# Patient Record
Sex: FEMALE | Race: ASIAN | NOT HISPANIC OR LATINO | Employment: STUDENT | URBAN - METROPOLITAN AREA
[De-identification: names, ages, dates, MRNs, and addresses within clinical notes are randomized per-mention and may not be internally consistent; named-entity substitution may affect disease eponyms.]

---

## 2017-01-26 ENCOUNTER — GENERIC CONVERSION - ENCOUNTER (OUTPATIENT)
Dept: OTHER | Facility: OTHER | Age: 32
End: 2017-01-26

## 2017-01-29 LAB — 25(OH)D3 SERPL-MCNC: 32.6 NG/ML (ref 30–100)

## 2017-02-02 ENCOUNTER — GENERIC CONVERSION - ENCOUNTER (OUTPATIENT)
Dept: OTHER | Facility: OTHER | Age: 32
End: 2017-02-02

## 2017-05-05 ENCOUNTER — GENERIC CONVERSION - ENCOUNTER (OUTPATIENT)
Dept: OTHER | Facility: OTHER | Age: 32
End: 2017-05-05

## 2017-05-08 ENCOUNTER — GENERIC CONVERSION - ENCOUNTER (OUTPATIENT)
Dept: OTHER | Facility: OTHER | Age: 32
End: 2017-05-08

## 2017-05-09 ENCOUNTER — GENERIC CONVERSION - ENCOUNTER (OUTPATIENT)
Dept: OTHER | Facility: OTHER | Age: 32
End: 2017-05-09

## 2017-05-09 LAB
HEPATITIS B SURFACE ANTIBODY (HISTORICAL): REACTIVE
HEPATITIS B SURFACE ANTIGEN (HISTORICAL): NEGATIVE

## 2017-05-10 ENCOUNTER — GENERIC CONVERSION - ENCOUNTER (OUTPATIENT)
Dept: OTHER | Facility: OTHER | Age: 32
End: 2017-05-10

## 2017-05-10 LAB
INTERPRETATION (HISTORICAL): NORMAL
QAUNTIFERON NIL VALUE (HISTORICAL): 0.04 IU/ML
QFT TB AG MINUS NIL VALUE (HISTORICAL): 0 IU/ML
QUANTIFERON CRITERIA (HISTORICAL): NORMAL
QUANTIFERON INCUB COMMENT (HISTORICAL): NORMAL
QUANTIFERON MITOGEN VALUE (HISTORICAL): 2.08 IU/ML
QUANTIFERON TB AG VALUE (HISTORICAL): 0.04 IU/ML
QUANTIFERON TB GOLD (HISTORICAL): NEGATIVE

## 2017-05-16 ENCOUNTER — GENERIC CONVERSION - ENCOUNTER (OUTPATIENT)
Dept: OTHER | Facility: OTHER | Age: 32
End: 2017-05-16

## 2017-05-17 LAB
HEP C RNA QUAL (HISTORICAL): <0.1 S/CO RATIO (ref 0–0.9)
INTERPRETATION (HISTORICAL): NORMAL

## 2017-05-18 ENCOUNTER — GENERIC CONVERSION - ENCOUNTER (OUTPATIENT)
Dept: OTHER | Facility: OTHER | Age: 32
End: 2017-05-18

## 2017-08-07 ENCOUNTER — GENERIC CONVERSION - ENCOUNTER (OUTPATIENT)
Dept: OTHER | Facility: OTHER | Age: 32
End: 2017-08-07

## 2017-12-05 ENCOUNTER — ALLSCRIPTS OFFICE VISIT (OUTPATIENT)
Dept: OTHER | Facility: OTHER | Age: 32
End: 2017-12-05

## 2017-12-05 DIAGNOSIS — R11.2 NAUSEA WITH VOMITING: ICD-10-CM

## 2017-12-05 DIAGNOSIS — E53.8 DEFICIENCY OF OTHER SPECIFIED B GROUP VITAMINS (CODE): ICD-10-CM

## 2017-12-05 DIAGNOSIS — G35 MULTIPLE SCLEROSIS (HCC): ICD-10-CM

## 2017-12-05 DIAGNOSIS — E55.9 VITAMIN D DEFICIENCY: ICD-10-CM

## 2017-12-06 NOTE — PROGRESS NOTES
Assessment    1  Nausea with vomiting (787 01) (R11 2)   2  Migraine headache (346 90) (G43 909)   3  Multiple sclerosis (340) (G35)    Plan  Migraine headache, Multiple sclerosis, Vitamin B 12 deficiency, Vitamin D deficiency    · (1) SED RATE; Status:Active; Requested for:05Dec2017;    · (1) VITAMIN B12; Status:Active; Requested for:05Dec2017;    · (1) VITAMIN D 25-HYDROXY; Status:Active; Requested for:05Dec2017;   Multiple sclerosis, Vitamin B 12 deficiency, Vitamin D deficiency    · (1) CBC/PLT/DIFF; Status:Active; Requested for:05Dec2017;    · (1) COMPREHENSIVE METABOLIC PANEL; Status:Active; Requested for:05Dec2017;    · (1) TSH WITH FT4 REFLEX; Status:Active; Requested for:05Dec2017;   Nausea with vomiting    · * US ABDOMEN COMPLETE; Status:Hold For - Scheduling; Requested for:05Dec2017;     Discussion/Summary    Suspected migraines with nausea and vomiting  May have an element of rebound headaches  Stop paracetamol with caffeine  No skipping meals  Stay hydrated  Defer starting prophylactic therapy for migraines for now pending results of labs and abdominal ultrasound  ophthalmology referral    Possible side effects of new medications were reviewed with the patient/guardian today  The treatment plan was reviewed with the patient/guardian  The patient/guardian understands and agrees with the treatment plan      Chief Complaint    1  Abdominal Pain   2  Nausea    History of Present Illness  One-month history of increasing frequency of headaches associated with nausea and vomiting  No visual aura  No noise or light sensitivity  known history of migraines  No nighttime pain  Patient was concerned her headaches were being triggered by spicy foods and eating leftovers  She has cut back on spices  She limits herself to 1 cup a tea a day  She has been using an over-the-counter product with caffeine paracetamol with caffeine   history of MS diagnosed > 1 year ago  she is currently on Tecfidera   1 episode of optic neuritis  intermittent numbness and tingling lower extremities  no weakness  no bowel or bladder problems  no dysphagia  no balance problems  MRI brain 2016 reviewed  documented vitamin D deficiency on vitamin D supplement  labs 2016 reviewed  Review of Systems   Constitutional: no fever,-- no recent weight gain,-- no chills-- and-- no recent weight loss  Eyes: no eyesight problems  ENT: no earache,-- no sore throat,-- no nasal discharge-- and-- no hoarseness  Cardiovascular: no chest pain-- and-- no palpitations  Respiratory: no cough,-- no orthopnea,-- no wheezing,-- no shortness of breath during exertion-- and-- no PND  Gastrointestinal: nausea-- and-- no reflux symptoms  On  PAN D which contains generic Protonix and Domperidone  , but-- no abdominal pain,-- no constipation,-- no diarrhea-- and-- no blood in stools  Genitourinary: last GYN > 1 year ago  periods normal , but-- no dysuria-- and-- no incontinence  Musculoskeletal: no arthralgias-- and-- no myalgias  Integumentary: no rashes-- and-- no skin lesions  Neurological: as noted in HPI,-- no dizziness-- and-- no difficulty walking  Psychiatric: no anxiety-- and-- no depression  Endocrine: history of vitamin D deficiency on vitamin D supplement, but-- no muscle weakness  Hematologic/Lymphatic: vitamin B 12 deficiency on OTC supplement  Active Problems  1  Migraine headache (346 90) (G43 909)   2  Multiple sclerosis (340) (G35)   3  Need for hepatitis C screening test (V73 89) (Z11 59)   4  Vitamin B 12 deficiency (266 2) (E53 8)   5  Vitamin D deficiency (268 9) (E55 9)    Family History  Mother    1  Family history of hypertension (V17 49) (Z82 49)   2  Family history of type 2 diabetes mellitus (V18 0) (Z83 3)  Paternal Grandmother    3  Family history of malignant neoplasm of breast (V16 3) (Z80 3)    Social History   · Never a smoker   · Social alcohol use (Z78 9)    Current Meds   1   Tecfidera 240 MG Oral Capsule Delayed Release; 1 tas 2 x daily Recorded   2  Vitamin D 2000 UNIT Oral Capsule; TAKE 1 CAPSULE Daily Recorded    Allergies  1  No Known Drug Allergies    Vitals  Vital Signs    Recorded: [id number] 11:23AM   Temperature 95 9 F   Heart Rate 84   Respiration 16   Systolic 876   Diastolic 58   Height 4 ft 11 in   Weight 121 lb 3 2 oz   BMI Calculated 24 48   BSA Calculated 1 49       Physical Exam   Constitutional  General appearance: No acute distress, well appearing and well nourished  Head and Face  Head and face: Normal  -- no scalp tenderness  Palpation of the face and sinuses: No sinus tenderness  Eyes  Conjunctiva and lids: No swelling, erythema or discharge  -- no nystagmus  Pupils and irises: Equal, round, reactive to light  -- no Robert Pernell pupil  Ophthalmoscopic examination: Normal fundi and optic discs  Ears, Nose, Mouth, and Throat  Otoscopic examination: Tympanic membranes translucent with normal light reflex  Canals patent without erythema  Lips, teeth, and gums: Normal, good dentition  -- tongue midline  Oropharynx: Normal with no erythema, edema, exudate or lesions  Neck  Neck: Supple, symmetric, trachea midline, no masses  Thyroid: Normal, no thyromegaly  There were no thyroid nodules  Pulmonary  Auscultation of lungs: Clear to auscultation  Cardiovascular  Auscultation of heart: Normal rate and rhythm, normal S1 and S2, no murmurs  Heart sounds: no gallop heard  Carotid pulses: 2+ bilaterally  right 2+,-- no bruit heard over the right carotid,-- left 2+-- and-- no bruit heard over the left carotid  Examination of extremities for edema and/or varicosities: Normal    Abdomen  Abdomen: Non-tender, no masses  Liver and spleen: No hepatomegaly or splenomegaly  Lymphatic  Palpation of lymph nodes in neck: No lymphadenopathy  no anterior cervical node enlargement,-- no posterior cervical node enlargement,-- no submandibular node enlargement-- and-- no supraclavicular node enlargement  Musculoskeletal  Gait and station: Normal    Muscle strength/tone: Normal   Motor Strength Findings: no pronator drift on the right,-- no pronator drift on the left,-- normal upper extremity strength-- and-- normal lower extremity strength  Skin  Skin and subcutaneous tissue: Normal without rashes or lesions  Palpation of skin and subcutaneous tissue: Normal turgor  Neurologic  Cranial nerves: Cranial nerves II-XII intact  -- Speech clear  -- hyper reflexia  No ankle clonus  Psychiatric  Mood and affect: Normal        Results/Data  (LC) HCV Antibody reflex to MIRANDA [id number] 12:35PM [facility]     Test Name Result Flag Reference   HCV Ab Hepatitis C virus Ab Signal/Cutoff <0 1 s/co ratio  0 0-0 9   Interpretation: Comment       Negative Not infected with HCV, unless recent infection is suspected or other evidence exists to indicate HCV infection  (1) HEP B SURFACE ANTIBODY [id number] 10:00AM [facility]     Test Name Result Flag Reference   Hep B Surface Ab, Qual Reactive       Non Reactive: Inconsistent with immunity,                                            less than 10 mIU/mL                              Reactive:     Consistent with immunity,                                            greater than 9 9 mIU/mL     () HBsAg Screen [id number] 10:00AM [facility]     Test Name Result Flag Reference   HBsAg Screen Negative  Negative     ([address] [facility]) QuantiFERON TB Gold (In Tube) [id number] 10:00AM [facility]     Test Name Result Flag Reference   QuantiFERON Incubation Comment     Specimen incubated at [address] [place], [place]  QuantiFERON TB Gold Negative  Negative   QuantiFERON Criteria Comment       To be considered positive a specimen should have a TB Ag minus Nil value greater than or equal to 0 35 IU/mL and in addition the TB Ag minus Nil value must be greater than or equal to 25% of the Nil value   There may be insufficient information in these values to differentiate between some negative and some indeterminate test values  QuantiFERON TB Ag Value 0 04 IU/mL     QuantiFERON Nil Value 0 04 IU/mL     QuantiFERON Mitogen Value 2 08 IU/mL     QFT TB Ag minus Nil Value 0 00 IU/mL     Interpretation: Comment       The QuantiFERON TB Gold (in Tube) assay is intended for use as an aid in the diagnosis of TB infection  Negative results suggest that there is no TB infection  In patients with high suspicion of exposure, a negative test should be repeated  A positive test indicates infection with Mycobacterium tuberculosis  Among individuals without tuberculosis infection, a positive test may be due to exposure to [place], M  helena or M  alpa  On the Internet, go to cdc gov/tb for further details  (1) VITAMIN D 25-HYDROXY [id number] 11:16AM Denise Gaspar courtesy copy of this report has been sent to the patient  Test Name Result Flag Reference   Vitamin D, 25-Hydroxy 32 6 ng/mL  30 0-100 0     Vitamin D deficiency has been defined by the [address] practice guideline as a level of serum 25-OH vitamin D less than 20 ng/mL (1,2)  The Endocrine Society went on to further define vitamin D insufficiency as a level between 21 and 29 ng/mL (2)  1  IOM ([place] of Medicine)  2010  Dietary reference    intakes for calcium and D  [facility]: The    [employer] [facility]  2  Rica MF, Minh PENG, Juju HOFFMAN, et al     Evaluation, treatment, and prevention of vitamin D    deficiency: an Endocrine Society clinical practice    guideline  JCEM  2011 Jul; 96(7):1911-30  (1) CBC/PLT/DIFF [id number] 10:01AM Denise Gaspar courtesy copy of this report has been sent to the patient  Test Name Result Flag Reference   WBC 6 6 x10E3/uL  3 4-10 8   RBC 4 41 x10E6/uL  3 77-5 28   Hemoglobin 12 1 g/dL  11 1-15 9   Hematocrit 37 0 %  34 0-46  6   MCV 84 fL  79-97   MCH 27 4 pg  26 6-33 0   MCHC 32 7 g/dL  31 5-35 7   RDW 13 6 %  12 3-15 4   Platelets 767 x10E3/uL  150-379   Neutrophils 67 %     Lymphs 27 %     Monocytes 5 %     Eos 1 %     Basos 0 %     Neutrophils (Absolute) 4 4 x10E3/uL  1 4-7 0   Lymphs (Absolute) 1 7 x10E3/uL  0 7-3 1   Monocytes(Absolute) 0 3 x10E3/uL  0 1-0 9   Eos (Absolute) 0 1 x10E3/uL  0 0-0 4   Baso (Absolute) 0 0 x10E3/uL  0 0-0 2   Immature Granulocytes 0 %     Immature Grans (Abs) 0 0 x10E3/uL  0 0-0 1     (1) COMPREHENSIVE METABOLIC PANEL [id number] 77:20IJ Katerin Cody     Test Name Result Flag Reference   Glucose, Serum 99 mg/dL  65-99   BUN 8 mg/dL  6-20   Creatinine, Serum 0 56 mg/dL L 0 57-1 00   BUN/Creatinine Ratio 14  8-20   Sodium, Serum 140 mmol/L  134-144   Potassium, Serum 4 6 mmol/L  3 5-5 2   Chloride, Serum 100 mmol/L     Carbon Dioxide, Total 21 mmol/L  18-29   Calcium, Serum 9 3 mg/dL  8 7-10 2   Protein, Total, Serum 7 0 g/dL  6 0-8 5   Albumin, Serum 4 6 g/dL  3 5-5 5   Globulin, Total 2 4 g/dL  1 5-4 5   A/G Ratio 1 9  1 1-2 5   Bilirubin, Total 0 4 mg/dL  0 0-1 2   Alkaline Phosphatase, S 51 IU/L     AST (SGOT) 11 IU/L  0-40   ALT (SGPT) 10 IU/L  0-32   eGFR If NonAfricn Am 126 mL/min/1 73  >59   eGFR If Africn Am 146 mL/min/1 73  >59     (1) LIPID PANEL, FASTING [id number] 10:01AM Katerin Cody     Test Name Result Flag Reference   Cholesterol, Total 171 mg/dL  100-199   Triglycerides 103 mg/dL  0-149   HDL Cholesterol 51 mg/dL  >39     According to ATP-III Guidelines, HDL-C >59 mg/dL is considered a negative risk factor for CHD  VLDL Cholesterol Jesse 21 mg/dL  5-40   LDL Cholesterol Calc 99 mg/dL  0-99   T  Chol/HDL Ratio 3 4 ratio units  0 0-4 4     T  Chol/HDL Ratio                                                            Men  Women                                              1/2 Avg  Risk  3 4    3 3                                                  Avg Risk  5 0    4 4                                               2X Avg  Risk  9 6    7 1                                               3X Avg  Risk 23 4   11 0     (1) VITAMIN D 25-HYDROXY [id number] 10:01AM Paul Cost     Test Name Result Flag Reference   Vitamin D, 25-Hydroxy 21 1 ng/mL L 30 0-100 0     Vitamin D deficiency has been defined by the [address] practice guideline as a level of serum 25-OH vitamin D less than 20 ng/mL (1,2)  The Endocrine Society went on to further define vitamin D insufficiency as a level between 21 and 29 ng/mL (2)  1  IOM ([place] of Medicine)  2010  Dietary reference    intakes for calcium and D  [address] [facility]: The    [employer]  2  Rica MF, Minh PENG, Juju HOFFMAN, et al     Evaluation, treatment, and prevention of vitamin D    deficiency: an Endocrine Society clinical practice    guideline  JCEM  2011 Jul; 96(7):1911-30  (1) VITAMIN B12 [id number] 10:01AM Paul Cost     Test Name Result Flag Reference   Vitamin B12 422 pg/mL  211-946     [facility]) Lyme Ab/Western Blot Reflex [id number] 10:01AM Paul Cost     Test Name Result Flag Reference   Lyme IgG/IgM Ab <0 91 ISR  0 00-0 90     Negative         <0 91                                                Equivocal  0 91 - 1 09                                                Positive         >1 09   Lyme Disease Ab, Quant, IgM <0 80 index  0 00-0 79     Negative         <0 80                                                Equivocal  0 80 - 1 19                                                Positive         >1 19                 IgM levels may peak at 3-6 weeks post infection, then                 gradually decline       (LC) TSH Rfx on Abnormal to Free T4 [id number] 10:01AM Paul Cost     Test Name Result Flag Reference   TSH 1 900 uIU/mL  0 450-4 500     [facility]) Sedimentation Rate-Westergren [id number] 10:01AM [facility]     Test Name Result Flag Reference   Sedimentation Rate-Westergren 10 mm/hr  0-32       Signatures   Electronically signed by : GISELE Dorado ; Dec  5 2017 12:44PM EST (Author)

## 2017-12-15 ENCOUNTER — HOSPITAL ENCOUNTER (OUTPATIENT)
Dept: ULTRASOUND IMAGING | Facility: HOSPITAL | Age: 32
Discharge: HOME | End: 2017-12-15
Attending: FAMILY MEDICINE
Payer: COMMERCIAL

## 2017-12-15 ENCOUNTER — GENERIC CONVERSION - ENCOUNTER (OUTPATIENT)
Dept: OTHER | Facility: OTHER | Age: 32
End: 2017-12-15

## 2017-12-15 DIAGNOSIS — R11.2 NAUSEA WITH VOMITING: ICD-10-CM

## 2017-12-15 PROCEDURE — 76700 US EXAM ABDOM COMPLETE: CPT

## 2017-12-16 LAB
25(OH)D3 SERPL-MCNC: 26 NG/ML (ref 30–100)
A/G RATIO (HISTORICAL): 1.8 (ref 1.2–2.2)
ALBUMIN SERPL BCP-MCNC: 4.7 G/DL (ref 3.5–5.5)
ALP SERPL-CCNC: 48 IU/L (ref 39–117)
ALT SERPL W P-5'-P-CCNC: 14 IU/L (ref 0–32)
AST SERPL W P-5'-P-CCNC: 15 IU/L (ref 0–40)
BASOPHILS # BLD AUTO: 0 %
BASOPHILS # BLD AUTO: 0 X10E3/UL (ref 0–0.2)
BILIRUB SERPL-MCNC: 0.3 MG/DL (ref 0–1.2)
BUN SERPL-MCNC: 12 MG/DL (ref 6–20)
BUN/CREA RATIO (HISTORICAL): 23 (ref 9–23)
CALCIUM SERPL-MCNC: 9.5 MG/DL (ref 8.7–10.2)
CHLORIDE SERPL-SCNC: 100 MMOL/L (ref 96–106)
CO2 SERPL-SCNC: 24 MMOL/L (ref 18–29)
CREAT SERPL-MCNC: 0.53 MG/DL (ref 0.57–1)
DEPRECATED RDW RBC AUTO: 13.7 % (ref 12.3–15.4)
EGFR AFRICAN AMERICAN (HISTORICAL): 146 ML/MIN/1.73
EGFR-AMERICAN CALC (HISTORICAL): 127 ML/MIN/1.73
EOSINOPHIL # BLD AUTO: 0 X10E3/UL (ref 0–0.4)
EOSINOPHIL # BLD AUTO: 1 %
ERYTHROCYTE SEDIMENTATION RATE (HISTORICAL): 4 MM/HR (ref 0–32)
GLUCOSE SERPL-MCNC: 94 MG/DL (ref 65–99)
HCT VFR BLD AUTO: 37.8 % (ref 34–46.6)
HGB BLD-MCNC: 12.6 G/DL (ref 11.1–15.9)
IMM.GRANULOCYTES (CD4/8) (HISTORICAL): 0 %
IMM.GRANULOCYTES (CD4/8) (HISTORICAL): 0 X10E3/UL (ref 0–0.1)
LYMPHOCYTES # BLD AUTO: 1.7 X10E3/UL (ref 0.7–3.1)
LYMPHOCYTES # BLD AUTO: 32 %
MCH RBC QN AUTO: 28.4 PG (ref 26.6–33)
MCHC RBC AUTO-ENTMCNC: 33.3 G/DL (ref 31.5–35.7)
MCV RBC AUTO: 85 FL (ref 79–97)
MONOCYTES # BLD AUTO: 0.4 X10E3/UL (ref 0.1–0.9)
MONOCYTES (HISTORICAL): 8 %
NEUTROPHILS # BLD AUTO: 3.1 X10E3/UL (ref 1.4–7)
NEUTROPHILS # BLD AUTO: 59 %
PLATELET # BLD AUTO: 276 X10E3/UL (ref 150–379)
POTASSIUM SERPL-SCNC: 4.5 MMOL/L (ref 3.5–5.2)
RBC (HISTORICAL): 4.44 X10E6/UL (ref 3.77–5.28)
SODIUM SERPL-SCNC: 140 MMOL/L (ref 134–144)
TOT. GLOBULIN, SERUM (HISTORICAL): 2.6 G/DL (ref 1.5–4.5)
TOTAL PROTEIN (HISTORICAL): 7.3 G/DL (ref 6–8.5)
TSH SERPL DL<=0.05 MIU/L-ACNC: 1.66 UIU/ML (ref 0.45–4.5)
VIT B12 SERPL-MCNC: 244 PG/ML (ref 232–1245)
WBC # BLD AUTO: 5.3 X10E3/UL (ref 3.4–10.8)

## 2017-12-17 ENCOUNTER — GENERIC CONVERSION - ENCOUNTER (OUTPATIENT)
Dept: OTHER | Facility: OTHER | Age: 32
End: 2017-12-17

## 2017-12-18 ENCOUNTER — GENERIC CONVERSION - ENCOUNTER (OUTPATIENT)
Dept: OTHER | Facility: OTHER | Age: 32
End: 2017-12-18

## 2018-01-10 NOTE — RESULT NOTES
Verified Results  (1) HEP B SURFACE ANTIBODY [id number] 10:00AM [facility]     Test Name Result Flag Reference   Hep B Surface Ab, Qual Reactive     Non Reactive:  Inconsistent with immunity,                                             less than 10 mIU/mL                               Reactive:     Consistent with immunity,                                             greater than 9 9 mIU/mL     (LC) HBsAg Screen [id number] 10:00AM [facility]     Test Name Result Flag Reference   HBsAg Screen Negative  Negative

## 2018-01-13 NOTE — RESULT NOTES
Verified Results  ([address] [facility]) QuantiFERON TB Gold (In Tube) [id number] 10:00AM Jhon Hathaway     Test Name Result Flag Reference   QuantiFERON Incubation Comment     Specimen incubated at [place], [place]  QuantiFERON TB Gold Negative  Negative   QuantiFERON Criteria Comment     To be considered positive a specimen should have a TB Ag minus Nil  value greater than or equal to 0 35 IU/mL and in addition the TB Ag  minus Nil value must be greater than or equal to 25% of the Nil  value  There may be insufficient information in these values to  differentiate between some negative and some indeterminate test  values  QuantiFERON TB Ag Value 0 04 IU/mL     QuantiFERON Nil Value 0 04 IU/mL     QuantiFERON Mitogen Value 2 08 IU/mL     QFT TB Ag minus Nil Value 0 00 IU/mL     Interpretation: Comment     The QuantiFERON TB Gold (in Tube) assay is intended for use as an aid  in the diagnosis of TB infection  Negative results suggest that there  is no TB infection  In patients with high suspicion of exposure, a  negative test should be repeated  A positive test indicates infection  with Mycobacterium tuberculosis  Among individuals without  tuberculosis infection, a positive test may be due to exposure to  [place], M  helena or M  marinum  On the Internet, go to  cdc gov/tb for further details

## 2018-01-16 NOTE — RESULT NOTES
Message  Adolfo I have enclosed a copy of your vitamin D level  32 6 improved from 21  1  normal 30 to 100  Verified Results  (1) VITAMIN D 25-HYDROXY [id number] 11:16AM Ric Gallegos courtesy copy of this report has been sent to  the patient  Test Name Result Flag Reference   Vitamin D, 25-Hydroxy 32 6 ng/mL  30 0-100 0   Vitamin D deficiency has been defined by the [address] practice guideline as a  level of serum 25-OH vitamin D less than 20 ng/mL (1,2)  The Endocrine Society went on to further define vitamin D  insufficiency as a level between 21 and 29 ng/mL (2)  1  IOM ([place] of Medicine)  2010  Dietary reference     intakes for calcium and D  [address] [facility]: The     [employer]  2  Rica MF, Minh PENG, Juju HOFFMAN, et al      Evaluation, treatment, and prevention of vitamin D     deficiency: an Endocrine Society clinical practice     guideline  JCEM  2011 Jul; 96(7):1911-30         Results/Data     (1) VITAMIN D 25-HYDROXY   Vitamin D, 25-Hydroxy: 32 6 ng/mL Reference Range 30 0-100 0    Signatures   Electronically signed by : GISELE Gordon ; Feb 2 2017  6:09AM EST                       (Author)

## 2018-01-17 NOTE — RESULT NOTES
Message  Adolfo I have enclosed a copy of your recent labs  all results are normal except for your vitamin D level  I would increase your dose of vitamin D to 4,000 IU daily  I would repeat your vitamin D level in 3 to 6 months  your blood sugar, kidney,liver and thyroid tests all normal  no anemia  Lyme test normal       Results/Data     (1) CBC/PLT/DIFF   WBC: 6 6 x10E3/uL Reference Range 3 4-10 8  RBC: 4 41 x10E6/uL Reference Range 3 77-5 28  Hemoglobin: 12 1 g/dL Reference Range 11 1-15 9  Hematocrit: 37 0 % Reference Range 34 0-46  6  MCV: 84 fL Reference Range 79-97  MCH: 27 4 pg Reference Range 26 6-33 0  MCHC: 32 7 g/dL Reference Range 31 5-35 7  RDW: 13 6 % Reference Range 12 3-15 4  Platelets: 076 J39M5/GROVER Reference Range 150-379  Neutrophils: 67 %  Lymphs: 27 %  Monocytes: 5 %  Eos: 1 %  Basos: 0 %  Neutrophils (Absolute): 4 4 x10E3/uL Reference Range 1 4-7 0  Lymphs (Absolute): 1 7 x10E3/uL Reference Range 0 7-3 1  Monocytes(Absolute): 0 3 x10E3/uL Reference Range 0 1-0 9  Eos (Absolute): 0 1 x10E3/uL Reference Range 0 0-0 4  Baso (Absolute): 0 0 x10E3/uL Reference Range 0 0-0 2  Immature Granulocytes: 0 %  Immature Grans (Abs): 0 0 x10E3/uL Reference Range 0 0-0 1  (1) COMPREHENSIVE METABOLIC PANEL   Glucose, Serum: 99 mg/dL Reference Range 65-99  BUN: 8 mg/dL Reference Range 6-20  Creatinine, Serum: 0 56 mg/dL Abnormal Low Reference Range 0 57-1 00  eGFR If NonAfricn Am: 126 mL/min/1 73 Reference Range >59  eGFR If Africn Am: 146 mL/min/1 73 Reference Range >59  BUN/Creatinine Ratio: 14  Reference Range 8-20  Sodium, Serum: 140 mmol/L Reference Range 134-144  Potassium, Serum: 4 6 mmol/L Reference Range 3 5-5 2  Chloride, Serum: 100 mmol/L Reference Range   Carbon Dioxide, Total: 21 mmol/L Reference Range 18-29  Calcium, Serum: 9 3 mg/dL Reference Range 8 7-10 2  Protein, Total, Serum: 7 0 g/dL Reference Range 6 0-8 5  Albumin, Serum: 4 6 g/dL Reference Range 3 5-5 5  Globulin, Total: 2 4 g/dL Reference Range 1 5-4 5  A/G Ratio: 1 9  Reference Range 1 1-2 5  Bilirubin, Total: 0 4 mg/dL Reference Range 0 0-1 2  Alkaline Phosphatase, S: 51 IU/L Reference Range   AST (SGOT): 11 IU/L Reference Range 0-40  ALT (SGPT): 10 IU/L Reference Range 0-32  (1) LIPID PANEL, FASTING   Cholesterol, Total: 171 mg/dL Reference Range 100-199  Triglycerides: 103 mg/dL Reference Range 0-149  HDL Cholesterol: 51 mg/dL Reference Range >39  VLDL Cholesterol Jesse: 21 mg/dL Reference Range 5-40  LDL Cholesterol Ca mg/dL Reference Range 0-99  T   Chol/HDL Ratio: 3 4 ratio units Reference Range 0 0-4 4  (LC) Lyme Ab/Western Blot Reflex   Lyme IgG/IgM Ab: <0 91 ISR Reference Range 0 00-0 90  Lyme Disease Ab, Quant, IgM: <0 80 index Reference Range 0 00-0 79  (1) VITAMIN D 25-HYDROXY   Vitamin D, 25-Hydroxy: 21 1 ng/mL Abnormal Low Reference Range 30 0-100 0  (LC) TSH Rfx on Abnormal to Free T4   TSH: 1 900 uIU/mL Reference Range 0 450-4 500  (LC) Sedimentation Rate-Westergren   Sedimentation Rate-Westergren: 10 mm/hr Reference Range 0-32  (1) VITAMIN B12   Vitamin B12: 422 pg/mL Reference Range 211-946    Signatures   Electronically signed by : GISELE Morales ; Oct  4 2016  7:29AM EST                       (Author)

## 2018-01-23 VITALS
TEMPERATURE: 95.9 F | HEIGHT: 59 IN | WEIGHT: 121.2 LBS | BODY MASS INDEX: 24.44 KG/M2 | RESPIRATION RATE: 16 BRPM | DIASTOLIC BLOOD PRESSURE: 58 MMHG | SYSTOLIC BLOOD PRESSURE: 102 MMHG | HEART RATE: 84 BPM

## 2018-01-23 NOTE — RESULT NOTES
Message  Adolfo I have enclosed a copy of your recent labs  all results are normal except for low vitamin-D level at 26 normal 30 to 100  Vitamin B12 level normal but at the low end of the range of normal  You can increase your dose of vitamin D to 4,000 units daily  you can also double your dose of vitamin B12  Verified Results  (1) CBC/PLT/DIFF [id number] 11:30AM Aram Isaak     Test Name Result Flag Reference   WBC 5 3 x10E3/uL  3 4-10 8   RBC 4 44 x10E6/uL  3 77-5 28   Hemoglobin 12 6 g/dL  11 1-15 9   Hematocrit 37 8 %  34 0-46  6   MCV 85 fL  79-97   MCH 28 4 pg  26 6-33 0   MCHC 33 3 g/dL  31 5-35 7   RDW 13 7 %  12 3-15 4   Platelets 769 I11J7/IK  150-379   Neutrophils 59 %  Not Estab  Lymphs 32 %  Not Estab  Monocytes 8 %  Not Estab  Eos 1 %  Not Estab  Basos 0 %  Not Estab  Neutrophils (Absolute) 3 1 x10E3/uL  1 4-7 0   Lymphs (Absolute) 1 7 x10E3/uL  0 7-3 1   Monocytes(Absolute) 0 4 x10E3/uL  0 1-0 9   Eos (Absolute) 0 0 x10E3/uL  0 0-0 4   Baso (Absolute) 0 0 x10E3/uL  0 0-0 2   Immature Granulocytes 0 %  Not Estab     Immature Grans (Abs) 0 0 x10E3/uL  0 0-0 1     (1) COMPREHENSIVE METABOLIC PANEL [id number] 51:98JS Aram Isaak     Test Name Result Flag Reference   Glucose, Serum 94 mg/dL  65-99   BUN 12 mg/dL  6-20   Creatinine, Serum 0 53 mg/dL L 0 57-1 00   BUN/Creatinine Ratio 23  9-23   Sodium, Serum 140 mmol/L  134-144   Potassium, Serum 4 5 mmol/L  3 5-5 2   Chloride, Serum 100 mmol/L     Carbon Dioxide, Total 24 mmol/L  18-29   Calcium, Serum 9 5 mg/dL  8 7-10 2   Protein, Total, Serum 7 3 g/dL  6 0-8 5   Albumin, Serum 4 7 g/dL  3 5-5 5   Globulin, Total 2 6 g/dL  1 5-4 5   A/G Ratio 1 8  1 2-2 2   Bilirubin, Total 0 3 mg/dL  0 0-1 2   Alkaline Phosphatase, S 48 IU/L     AST (SGOT) 15 IU/L  0-40   ALT (SGPT) 14 IU/L  0-32   eGFR If NonAfricn Am 127 mL/min/1 73  >59   eGFR If Africn Am 146 mL/min/1 73  >59     (1) VITAMIN B12 [id number] 11:30AM Aram Mulligan Test Name Result Flag Reference   Vitamin B12 244 pg/mL  232-1245   **Please note reference interval change**     (1) VITAMIN D 25-HYDROXY [id number] 11:30AM Bethany Rivera     Test Name Result Flag Reference   Vitamin D, 25-Hydroxy 26 0 ng/mL L 30 0-100 0   Vitamin D deficiency has been defined by the [address] practice guideline as a  level of serum 25-OH vitamin D less than 20 ng/mL (1,2)  The Endocrine Society went on to further define vitamin D  insufficiency as a level between 21 and 29 ng/mL (2)  1  IOM ([place] of Medicine)  2010  Dietary reference     intakes for calcium and D  [facility]: The     [employer]  2  Rica MF, Minh PENG, Juju HOFFMAN, et al      Evaluation, treatment, and prevention of vitamin D     deficiency: an Endocrine Society clinical practice     guideline  JCEM  2011 Jul; 96(7):1911-30       (LC) TSH Rfx on Abnormal to Free T4 [id number] 11:30AM Bethany Rivera     Test Name Result Flag Reference   TSH 1 660 uIU/mL  0 450-4 500     [facility]) Sedimentation Rate-Westergren [id number] 11:30AM Bethany Rivera     Test Name Result Flag Reference   Sedimentation Rate-Westergren 4 mm/hr  0-32       Signatures   Electronically signed by : GISELE Fernandes ; Dec 18 2017  6:26AM EST                       (Author)

## 2018-01-23 NOTE — RESULT NOTES
Verified Results  * US ABDOMEN COMPLETE [id number] 08:53AM Seth Davison Order Number: [id number]    - Patient Instructions: To schedule this appointment, please contact Central Scheduling at [phone]  Test Name Result Flag Reference   US ABDOMEN COMPLETE (Report)     ABDOMEN ULTRASOUND, COMPLETE      INDICATION: 78-year-old female with abdominal pain     COMPARISON: None  TECHNIQUE:  Real-time ultrasound of the abdomen was performed with a curvilinear transducer with both volumetric sweeps and still imaging techniques  FINDINGS:     PANCREAS: Visualized portions of the pancreas are within normal limits  AORTA AND IVC: Visualized portions are normal for patient age  LIVER:   Size: Within normal range  The liver measures 13 33 cm in the midclavicular line  Contour: Surface contour is smooth  Parenchyma: Liver parenchyma is hyperechoic compared to the renal parenchyma, which may indicate underlying mild hepatic steatosis  No focal lesion  Echotexture is normal    No evidence of suspicious mass  Limited imaging of the main portal vein shows it to be patent and hepatopetal      BILIARY:   The gallbladder is normal in caliber  No wall thickening or pericholecystic fluid  No stones or sludge identified  Sonographic Telma Gills sign is negative  No intrahepatic biliary dilatation  CBD measures 3 mm  No choledocholithiasis  KIDNEY:    Right kidney measures 11 57 x 4 027 m with cortical thickness of 1 26 cm  Within normal limits  Left kidney measures 10 98 x 4 27 m with cortical thickness 1 17 cm  Within normal limits  SPLEEN:    Measures 9 37 x 9 68 x 3 47 cm  Within normal limits  ASCITES: None  IMPRESSION:     Hyperechogenicity of the liver compared to the adjacent kidney that may represent mild fatty infiltration  The echotexture is normal and there is no focal lesion  Otherwise unremarkable examination         Workstation performed: [id number]     Signed by:   Rachel Martinez MD   12/16/17

## 2018-02-02 ENCOUNTER — OFFICE VISIT (OUTPATIENT)
Dept: OBGYN CLINIC | Facility: CLINIC | Age: 33
End: 2018-02-02
Payer: COMMERCIAL

## 2018-02-02 VITALS
DIASTOLIC BLOOD PRESSURE: 64 MMHG | WEIGHT: 122.6 LBS | RESPIRATION RATE: 16 BRPM | BODY MASS INDEX: 23.15 KG/M2 | HEART RATE: 96 BPM | SYSTOLIC BLOOD PRESSURE: 108 MMHG | HEIGHT: 61 IN

## 2018-02-02 DIAGNOSIS — N94.10 DYSPAREUNIA IN FEMALE: ICD-10-CM

## 2018-02-02 DIAGNOSIS — M62.89 PELVIC FLOOR DYSFUNCTION: ICD-10-CM

## 2018-02-02 DIAGNOSIS — Z01.419 WELL WOMAN EXAM WITH ROUTINE GYNECOLOGICAL EXAM: Primary | ICD-10-CM

## 2018-02-02 PROCEDURE — 99395 PREV VISIT EST AGE 18-39: CPT | Performed by: OBSTETRICS & GYNECOLOGY

## 2018-02-02 RX ORDER — MULTIVIT-MIN/IRON/FOLIC ACID/K 18-600-40
1 CAPSULE ORAL DAILY
COMMUNITY

## 2018-02-02 RX ORDER — DIMETHYL FUMARATE 240 MG/1
CAPSULE ORAL
COMMUNITY

## 2018-02-02 NOTE — PROGRESS NOTES
ASSESSMENT & PLAN: Jw Reina is a 33 y o  No obstetric history on file  with {NORMAL/ABNORMAL URFD:18326} gynecologic exam     1   Routine well woman exam done today  2  Pap and HPV:  The patient's [unfilled]  Pap and cotesting {DONE/NOT DONE:[id number]::"was not"} done today  Current ASCCP Guidelines reviewed  ***  3   The following were reviewed in today's visit: {Gyn counselin}  4  ***    CC:  Annual Gynecologic Examination    HPI: Jw Reina is a 33 y o  No obstetric history on file  who presents for annual gynecologic examination  She has the following concerns:  ***    Health Maintenance:    She exercises {#:30880} days per week with ***  She wears her seatbelt routinely  She {DOES/DOES IPV:35450} perform {Desc; regular/irre} monthly self breast exams  She feels safe at home  Patients {DOES/DOES VQO:37685} follow a *** diet  Her [unfilled]      Past Medical History:   Diagnosis Date    [facility]        History reviewed  No pertinent surgical history  Past OB/Gyn History:  OB History     No data available        Menstrual cycles every *** days, with *** days of *** bleeding  History of sexually transmitted infection {YES/NO:79978}  History of abnormal pap smears  {YES/NO:33810} ***    Patient {IS/ IS NOT:44829} currently sexually active  {Sexual social history md:39954} Birth control: {birth control options:76457}  Family History   Problem Relation Age of Onset    Diabetes Mother     Hypertension Mother     Breast cancer Paternal Grandmother        Social History:  Social History     Social History    Marital status: /Civil Union     Spouse name: N/A    Number of children: N/A    Years of education: N/A     Occupational History    Not on file       Social History Main Topics    Smoking status: Never Smoker    Smokeless tobacco: Never Used    Alcohol use Yes      Comment: rare    Drug use: No    Sexual activity: Yes Partners: Male     Birth control/ protection: Condom     Other Topics Concern    Not on file     Social History Narrative    No narrative on file     Presently lives with ***  Patient is {Desc; marital status:62}  Patient is currently {[facility] Employment:43866} ***  Allergies no known allergies    Current Outpatient Prescriptions:     Cyanocobalamin (VITAMIN B 12 PO), Take by mouth, Disp: , Rfl:     Cholecalciferol (VITAMIN D) 2000 units CAPS, Take 1 capsule by mouth daily, Disp: , Rfl:     Dimethyl Fumarate (TECFIDERA) 240 MG CPDR, Take by mouth, Disp: , Rfl:     Review of Systems:  A complete review of systems was performed and was negative, except as listed  Denies weight changes, excessive fatigue, urinary incontinence, urinary frequency, constipation or bowel changes, blood in stool, severe headaches, vaginal bleeding, vaginal discharge  ***    Physical Exam:  /64   Pulse 96   Resp 16   Ht 5' 1" (1 549 m)   Wt 55 6 kg (122 lb 9 6 oz)   LMP 01/26/2018 (Approximate)   BMI 23 17 kg/m²    GEN: The patient was alert and oriented x3, pleasant well-appearing female in no acute distress  HEENT:  Unremarkable, no anterior or posterior lymphadenopathy, no thyromegaly  CV:  RRR, no murmurs  RESP:  Clear to auscultation bilaterally  BREAST:  Symmetric breasts with no palpable breast masses or obvious breast lesions  She has no retractions or nipple discharge  She has no axillary abnormalities or palpable masses  Self breast exam is taught  ABD:  Soft, nontender, nondistended, normoactive bowel sounds, ***  EXT: nontender, no edema  PELVIC:  Normal appearing external female genitalia, normal vaginal epithelium, {TDNYESNO:92027} discharge  Cervix {DESC; ABSENT OR PRESENT:19119}   Bimanual: {DESC; ABSENT OR PRESENT:19119} CMT,  {NORMAL/ABNORMAL ONLY:20683} uterus, {Tender/Non-tender:20250}  {YES/NO:76643} palpable adnexal masses  Pap {WAS/WAS FKU:07187} collected

## 2018-02-02 NOTE — PROGRESS NOTES
ASSESSMENT & PLAN: Kartik Alfonso is a 33 y o  G0  with normal gynecologic exam     1   Routine well woman exam done today  2  Pap and HPV:  Not performed - patient could not tolerate the exam    3   The following were reviewed in today's visit: breast self exam, family planning choices, exercise and healthy diet  4  Pelvic floor dysfunction - pelvic floor PT  5   Discussed timing of intercourse  CC:  Annual Gynecologic Examination    HPI: Kartik Alfonso is a 33 y o  No obstetric history on file  who presents for annual gynecologic examination  She has the following concerns:  Has never been pregnant  Wants to start trying soon to get pregnant (5-6)  Has pain with intercourse and with pap smears  Health Maintenance:      She exercises SOMETIMES  She wears her seatbelt routinely  She does perform regular monthly self breast exams  She feels safe at home  Patients does follow a healty diet  Never had pap    Past Medical History:   Diagnosis Date    MS (multiple [address]) ([facility] )        History reviewed  No pertinent surgical history  Past OB/Gyn History:  OB History     No data available        Menstrual cycles every 28 days, with  4-5 days of bleeding  History of sexually transmitted infection No  History of abnormal pap smears  Never had pap     Patient is currently sexually active  heterosexual Birth control: none  Family History   Problem Relation Age of Onset    Diabetes Mother     Hypertension Mother     Breast cancer Paternal Grandmother        Social History:  Social History     Social History    Marital status: /Civil Union     Spouse name: N/A    Number of children: N/A    Years of education: N/A     Occupational History    Not on file       Social History Main Topics    Smoking status: Never Smoker    Smokeless tobacco: Never Used    Alcohol use Yes      Comment: rare    Drug use: No    Sexual activity: Yes     Partners: Male     Birth control/ protection: Condom     Other Topics Concern    Not on file     Social History Narrative    No narrative on file     Presently lives with   Patient is   Patient is currently unemployed - looking for work  No Known Allergies    Current Outpatient Prescriptions:     Cyanocobalamin (VITAMIN B 12 PO), Take by mouth, Disp: , Rfl:     Cholecalciferol (VITAMIN D) 2000 units CAPS, Take 1 capsule by mouth daily, Disp: , Rfl:     Dimethyl Fumarate (TECFIDERA) 240 MG CPDR, Take by mouth, Disp: , Rfl:     Review of Systems:  A complete review of systems was performed and was negative, except as listed  Denies weight changes, excessive fatigue, urinary incontinence, urinary frequency, constipation or bowel changes, blood in stool, severe headaches, vaginal bleeding, vaginal discharge  Physical Exam:  /64   Pulse 96   Resp 16   Ht 5' 1" (1 549 m)   Wt 55 6 kg (122 lb 9 6 oz)   LMP 01/26/2018 (Approximate)   BMI 23 17 kg/m²    GEN: The patient was alert and oriented x3, pleasant well-appearing female in no acute distress  HEENT:  Unremarkable, no anterior or posterior lymphadenopathy, no thyromegaly  CV:  RRR, no murmurs  RESP:  Clear to auscultation bilaterally  BREAST:  Symmetric breasts with no palpable breast masses or obvious breast lesions  She has no retractions or nipple discharge  She has no axillary abnormalities or palpable masses  Self breast exam is taught  ABD:  Soft, nontender, non-distended     EXT: nontender, no edema  PELVIC:  Normal appearing external female genitalia, unable to tolerate speculum exam or bimanual exam

## 2018-02-22 ENCOUNTER — EVALUATION (OUTPATIENT)
Dept: PHYSICAL THERAPY | Facility: REHABILITATION | Age: 33
End: 2018-02-22
Payer: COMMERCIAL

## 2018-02-22 DIAGNOSIS — M62.89 PELVIC FLOOR DYSFUNCTION: ICD-10-CM

## 2018-02-22 DIAGNOSIS — R10.2 PELVIC PAIN IN FEMALE: Primary | ICD-10-CM

## 2018-02-22 PROCEDURE — 97161 PT EVAL LOW COMPLEX 20 MIN: CPT | Performed by: PHYSICAL THERAPIST

## 2018-02-22 PROCEDURE — G8990 OTHER PT/OT CURRENT STATUS: HCPCS | Performed by: PHYSICAL THERAPIST

## 2018-02-22 PROCEDURE — G8991 OTHER PT/OT GOAL STATUS: HCPCS | Performed by: PHYSICAL THERAPIST

## 2018-02-22 NOTE — PROGRESS NOTES
Assessment  Impairments: abnormal muscle tone, impaired physical strength, lacks appropriate home exercise program and pain with function    Assessment details: The patient is a 33 y o  [unfilled] with complaints of severe pain during intercourse and with GYN assessment  She presents with hypersensitivity around the vaginal vestibule and vaginal opening as well as high pelvic floor muscle tone due to pain and hypersensitivity  She also demonstrates pelvic floor muscle weakness because of this  She would benefit from pelvic floor therapy to help reduce pain and symptoms, address impairments and maximize function and quality of life upon discharge, especially as she and her  would like to start a family  She will be given updated HEP throughout episode of care  Understanding of Dx/Px/POC: good   Prognosis: good    Goals  ST  The patient will reduce pain with penetration by 25% in 6 to 8 weeks  2  The patient will be independent with self-stretching with vaginal dilators in 6 to 8 weeks  LT  The patient will tolerate a GYN exam with a speculum upon discharge  2  The patient will be able to have complete intercourse upon discharge  Plan  Patient would benefit from: skilled PT  Planned modality interventions: biofeedback  Planned therapy interventions: home exercise program, stretching, strengthening, therapeutic activities, therapeutic exercise, manual therapy, patient education and neuromuscular re-education (desensitization)  Frequency: 1x week  Duration in visits: 12  Duration in weeks: 12  Treatment plan discussed with: patient        Subjective Evaluation    History of Present Illness  Date of onset: 2015  Mechanism of injury: GYN History  -painful intercourse  -pain with gynecological exam  -no history  -regular menstrual cycle    The patient states that she has been experiencing pain during intercourse since about    She notes that she was  around this time and has not been able to have pain free intercourse since  She sees Dr Jr Oneal and states that she does not tolerate her GYN exams well  She has pain with a small speculum and she is unable to have a PAP smear performed  She was referred to pelvic floor therapy by Dr Jr Oneal  Pain  Current pain ratin  At best pain ratin (during intercourse)  At worst pain ratin (during GYN exam)  Quality: tight and burning  Progression: improved (a little bit)    Treatments  No previous or current treatments  Patient Goals  Patient goal: To eliminate pain        PT Women's Health Subjective:   History of Present Illness:     Prior Pregnancy: No    Bladder Function:     Voiding Difficulties: frequent urination      Voiding Difficulties comment:  Sometimes    Painful urination: No      Fluid Intake Type: Water and tea    Intake (ounces): Water: 8,   Bowel Function:     Voiding DIfficulties comment:  Sometimes    Bowel Function comments:  Regular bowel movements  Sexual Function:     Sexually Active:  Sexually active    Pain during intercourse: Yes    Pain:     Current pain ratin    At best pain ratin (during intercourse)    At worst pain ratin (during GYN exam)    Quality:  Tight and burning    Progression:  Improved (a little bit)  Treatments:     None    Patient Goals: Other patient goals:  ST  The patient will reduce pain with penetration by 25% in 6 to 8 weeks  2  The patient will be independent with self-stretching with vaginal dilators in 6 to 8 weeks  LT  The patient will tolerate a GYN exam with a speculum upon discharge  2  The patient will be able to have complete intercourse upon discharge  Objective   Pelvic Floor Exam     External Exam   Position: supine exam    General Perineum Exam: perineum intact  Skin inspection:   no scars present       Visualization of PFM Contraction: bearing down or bulging with attempt    Pelvic Floor Muscle Control   Pelvic floor muscle relaxation is incomplete     Accessory muscle activity: abdominals and adductors    PERF   Power right: 0/5  Power left: 1/5    Precautions: multiple sclerosis    Daily Treatment Diary     Manual  2/22            Densensitization  techniques NP            Pelvic floor muscle releases NP                                                       Exercise Diary  2/22            Diaphragmatic breathing NP            DKC stretch with diaphragmatic breathing NP            PFMC: slow holds 5"W/10"R NP                                                                                                                                                                                                                                             Modalities  2/22

## 2018-03-06 ENCOUNTER — OFFICE VISIT (OUTPATIENT)
Dept: PHYSICAL THERAPY | Facility: REHABILITATION | Age: 33
End: 2018-03-06
Payer: COMMERCIAL

## 2018-03-06 DIAGNOSIS — R10.2 PELVIC PAIN IN FEMALE: Primary | ICD-10-CM

## 2018-03-06 DIAGNOSIS — M62.89 PELVIC FLOOR DYSFUNCTION: ICD-10-CM

## 2018-03-06 PROCEDURE — 97140 MANUAL THERAPY 1/> REGIONS: CPT | Performed by: PHYSICAL THERAPIST

## 2018-03-06 PROCEDURE — 97112 NEUROMUSCULAR REEDUCATION: CPT | Performed by: PHYSICAL THERAPIST

## 2018-03-06 NOTE — PROGRESS NOTES
Daily Note     Today's date: 3/6/2018  Patient name: Nohemi Ta  : 1985  MRN: [id number]  Referring provider: Evaristo Cali DO  Dx:   Encounter Diagnosis     ICD-10-CM    1  Pelvic pain in female R10 2    2  Pelvic floor dysfunction M62 89                   Subjective: The patient states that she had some mild soreness after her previous treatment session  She had intercourse with her  the other day and reports that it was successful, but painful  Objective: See treatment diary below      Assessment: Tolerated treatment fair  Patient would benefit from continued PT  The patient demonstrates increased resting rate on Biofeedback assessment today  She had baseline resting rate of 5 0 - 6 0 mV at rest initially  She was able to reduce this to 2 0 - 3 0 mV with slow hold pelvic floor contractions  She demonstrates muscle guarding and tension with gentle desensitization around the vaginal vestibule and introitus today, especially on the right  She reports burning on both the right and left sides, worse on the right than the left  Continue to work on desensitization and gentle perineal stretching as able as patient demonstrates pain around the perineum on the right and the left  Also recommended that the patient consider alternate positions to try to reduce muscle guarding such as sidelying, in attempt to reduce pain  Plan: Continue per plan of care        Precautions: multiple sclerosis    Daily Treatment Diary     Manual  2/22 3/6           Densensitization  techniques NP 15 min           Pelvic floor muscle releases NP                                                       Exercise Diary  2/22 3/6           Diaphragmatic breathing NP HEP           DKC stretch with diaphragmatic breathing NP NP           PFMC: slow holds 5"W/10"R NP 10x Modalities  2/22

## 2018-03-12 ENCOUNTER — OFFICE VISIT (OUTPATIENT)
Dept: PHYSICAL THERAPY | Facility: REHABILITATION | Age: 33
End: 2018-03-12
Payer: COMMERCIAL

## 2018-03-12 DIAGNOSIS — R10.2 PELVIC PAIN IN FEMALE: Primary | ICD-10-CM

## 2018-03-12 DIAGNOSIS — M62.89 PELVIC FLOOR DYSFUNCTION: ICD-10-CM

## 2018-03-12 PROCEDURE — 97140 MANUAL THERAPY 1/> REGIONS: CPT | Performed by: PHYSICAL THERAPIST

## 2018-03-12 PROCEDURE — 97112 NEUROMUSCULAR REEDUCATION: CPT | Performed by: PHYSICAL THERAPIST

## 2018-03-12 NOTE — PROGRESS NOTES
Daily Note     Today's date: 3/12/2018  Patient name: Chinmay Smith  : 1985  MRN: [id number]  Referring provider: Ariana Duong DO  Dx:   Encounter Diagnosis     ICD-10-CM    1  Pelvic pain in female R10 2    2  Pelvic floor dysfunction M62 89                   Subjective: The patient states that she had a little soreness after her previous treatment session  She notes that she tried the sidelying position during intercourse  She reports that she was able to tolerate further penetration and experienced burning further into the initial penetration  Objective: See treatment diary below  Resting rate 5 0 mV pre-treatment  This was reduced to 3 0 mV with diaphragmatic breathing and exercises  Assessment: Tolerated treatment fair  Patient continues to report burning and "poking" sensations in vaginal vestibule and at the introitus and perineum  Her symptoms are more severe on the right than the left  She was able to relax her hips and overcome muscle guarding more today than last visit  Patient had difficulty with breathing and was unable to perform an effective pelvic floor muscle contraction today utilizing Biofeedback to perform PFMC: slow holds  Plan: Continue per plan of care  Per patient's tolerance      Precautions: multiple sclerosis    Daily Treatment Diary     Manual  2/22 3/6 3/12          Densensitization  techniques NP 15 min 15 min          Pelvic floor muscle releases NP                                                       Exercise Diary  2/22 3/6 3/12          Diaphragmatic breathing NP HEP 2 min          DKC stretch with diaphragmatic breathing NP NP 3 breaths x 5          PFMC: slow holds 5"W/10"R NP 10x 10x                                                                                                                                                                                                                                           Modalities

## 2018-03-19 ENCOUNTER — OFFICE VISIT (OUTPATIENT)
Dept: PHYSICAL THERAPY | Facility: REHABILITATION | Age: 33
End: 2018-03-19
Payer: COMMERCIAL

## 2018-03-19 DIAGNOSIS — M62.89 PELVIC FLOOR DYSFUNCTION: ICD-10-CM

## 2018-03-19 DIAGNOSIS — R10.2 PELVIC PAIN IN FEMALE: Primary | ICD-10-CM

## 2018-03-19 PROCEDURE — 97140 MANUAL THERAPY 1/> REGIONS: CPT | Performed by: PHYSICAL THERAPIST

## 2018-03-19 PROCEDURE — 97112 NEUROMUSCULAR REEDUCATION: CPT | Performed by: PHYSICAL THERAPIST

## 2018-03-19 NOTE — PROGRESS NOTES
Daily Note     Today's date: 3/19/2018  Patient name: Eligio Callahan  : 1985  MRN: [id number]  Referring provider: Chris Rod DO  Dx:   Encounter Diagnosis     ICD-10-CM    1  Pelvic pain in female R10 2    2  Pelvic floor dysfunction M62 89                   Subjective: The patient states that she was not very sore after her previous treatment session  She notes that she does not think she attempted intercourse this past week because they had company all weekend  Objective: See treatment diary below  Initial Biofeedback resting rate: 9 0 mV --> reduced to 5 0 mV with exercises and diaphragmatic breathing  Assessment: Tolerated treatment well    Patient demonstrated improved tolerance compared to last visit  She continues to experience increased burning around the introitus on the right  She had minimal pain and burning on the left and PT was able to perform gentle muscles in the first layer of pelvic floor muscles  The patient reports that she focused on keeping her hips relaxed  Overall improvement noted today  Plan: Continue per plan of care       Precautions: multiple sclerosis    Daily Treatment Diary     Manual  2/22 3/6 3/12 3/19         Densensitization  techniques NP 15 min 15 min 10 min         Pelvic floor muscle releases NP   10 min - left                                                    Exercise Diary  2/22 3/6 3/12 3/19         Diaphragmatic breathing NP HEP 2 min 3 min         DKC stretch with diaphragmatic breathing NP NP 3 breaths x 5 3 breaths x 5 stretches         PFMC: slow holds 5"W/10"R NP 10x 10x 10x         Prone stretch with PFM downtraining                                                                                                                                                                                                                                 Modalities

## 2018-03-26 ENCOUNTER — OFFICE VISIT (OUTPATIENT)
Dept: PHYSICAL THERAPY | Facility: REHABILITATION | Age: 33
End: 2018-03-26
Payer: COMMERCIAL

## 2018-03-26 DIAGNOSIS — M62.89 PELVIC FLOOR DYSFUNCTION: ICD-10-CM

## 2018-03-26 DIAGNOSIS — R10.2 PELVIC PAIN IN FEMALE: Primary | ICD-10-CM

## 2018-03-26 PROCEDURE — 97140 MANUAL THERAPY 1/> REGIONS: CPT | Performed by: PHYSICAL THERAPIST

## 2018-03-26 PROCEDURE — 97112 NEUROMUSCULAR REEDUCATION: CPT | Performed by: PHYSICAL THERAPIST

## 2018-03-26 NOTE — PROGRESS NOTES
Daily Note     Today's date: 3/26/2018  Patient name: Chinmay Smith  : 1985  MRN: [id number]  Referring provider: Ariana Duogn DO  Dx:   Encounter Diagnosis     ICD-10-CM    1  Pelvic pain in female R10 2    2  Pelvic floor dysfunction M62 89                   Subjective: The patient states that she got her period after her previous visit so she was unable to attempt intercourse  She reports that she was a little more sore after her previous treatment session but this subsided  Objective: See treatment diary below      Assessment: Tolerated treatment fair  Patient demonstrated reduction in resting tone during treatment today  Initial Biofeedback reading 4 0 mV at rest after exercises  She also demonstrated improved tolerance with manual therapy  She experienced burning around introitus on both the right and the left but this subsided  PT able to perform gentle muscle releases around perineum and first layer of pelvic floor muscles  She reports less pain and discomfort on the right compared to her previous visit  Continue to work on reduction of muscle tone and desensitization around the introitus  Plan: Continue per plan of care       Precautions: multiple sclerosis    Daily Treatment Diary     Manual  2/22 3/6 3/12 3/19 3/26        Densensitization  techniques NP 15 min 15 min 10 min         Pelvic floor muscle releases NP   10 min - left 20 min                                                   Exercise Diary  2/22 3/6 3/12 3/19 3/26        Diaphragmatic breathing NP HEP 2 min 3 min NP        DKC stretch with diaphragmatic breathing NP NP 3 breaths x 5 3 breaths x 5 stretches 5 breaths x 5        PFMC: slow holds 5"W/10"R NP 10x 10x 10x NP        Prone stretch with PFM downtraining     3 breaths x 5        Pelvic Floor "drops" with pball     2 breaths x 10 Modalities  2/22

## 2018-03-29 ENCOUNTER — APPOINTMENT (OUTPATIENT)
Dept: PHYSICAL THERAPY | Facility: REHABILITATION | Age: 33
End: 2018-03-29
Payer: COMMERCIAL

## 2018-04-03 ENCOUNTER — OFFICE VISIT (OUTPATIENT)
Dept: PHYSICAL THERAPY | Facility: REHABILITATION | Age: 33
End: 2018-04-03
Payer: COMMERCIAL

## 2018-04-03 DIAGNOSIS — M62.89 PELVIC FLOOR DYSFUNCTION: ICD-10-CM

## 2018-04-03 DIAGNOSIS — R10.2 PELVIC PAIN IN FEMALE: Primary | ICD-10-CM

## 2018-04-03 PROCEDURE — 97112 NEUROMUSCULAR REEDUCATION: CPT | Performed by: PHYSICAL THERAPIST

## 2018-04-03 PROCEDURE — 97140 MANUAL THERAPY 1/> REGIONS: CPT | Performed by: PHYSICAL THERAPIST

## 2018-04-03 NOTE — PROGRESS NOTES
Daily Note     Today's date: 4/3/2018  Patient name: Scott Mccracken  : 1985  MRN: [id number]  Referring provider: Juan Luis Florse DO  Dx:   Encounter Diagnosis     ICD-10-CM    1  Pelvic pain in female R10 2    2  Pelvic floor dysfunction M62 89                   Subjective: The patient states that she and her  attempted intercourse and it was more tolerable  She used Slippery Stuff Gel Lubricant and this helped  She states that she was able to tolerate more penetration but not able to complete intercourse  She notes 25% penetration estimated  Patient was 15 min late today  Objective: See treatment diary below      Assessment: Tolerated treatment well  Patient would benefit from continued PT  She demonstrates significant improvement today  She tolerates manual therapy well with perineal stretching and pelvic floor muscle releases  50% penetration bilaterally today with treatment finger of therapist  Continue to progress patient as tolerated and discuss vaginal dilators in future if she continues to demonstrate steady improvements  Resting rate 5 0 mV initially reduced to 2 5 - 3 0 mV after manual therapy techniques  Patient continues to experience burning around introitus, but less severe  Plan: Continue per plan of care        Precautions: multiple sclerosis    Daily Treatment Diary     Manual  2/22 3/6 3/12 3/19 3/26 4/3       Densensitization  techniques NP 15 min 15 min 10 min         Pelvic floor muscle releases and perineal stretches NP   10 min - left 20 min 20 min                                                  Exercise Diary  2/22 3/6 3/12 3/19 3/26 43       Diaphragmatic breathing NP HEP 2 min 3 min NP NP       DKC stretch with diaphragmatic breathing NP NP 3 breaths x 5 3 breaths x 5 stretches 5 breaths x 5 5 breaths x 5       PFMC: slow holds 5"W/10"R NP 10x 10x 10x NP 10x       Prone stretch with PFM downtraining     3 breaths x 5 NP       Pelvic Floor "drops" with pball 2 breaths x 10 5 breaths x 5 times                                                                                                                                                                                                              Modalities  2/22

## 2018-04-09 ENCOUNTER — APPOINTMENT (OUTPATIENT)
Dept: PHYSICAL THERAPY | Facility: REHABILITATION | Age: 33
End: 2018-04-09
Payer: COMMERCIAL

## 2018-04-10 ENCOUNTER — OFFICE VISIT (OUTPATIENT)
Dept: PHYSICAL THERAPY | Facility: REHABILITATION | Age: 33
End: 2018-04-10
Payer: COMMERCIAL

## 2018-04-10 ENCOUNTER — APPOINTMENT (OUTPATIENT)
Dept: PHYSICAL THERAPY | Facility: REHABILITATION | Age: 33
End: 2018-04-10
Payer: COMMERCIAL

## 2018-04-10 DIAGNOSIS — M62.89 PELVIC FLOOR DYSFUNCTION: ICD-10-CM

## 2018-04-10 DIAGNOSIS — R10.2 PELVIC PAIN IN FEMALE: Primary | ICD-10-CM

## 2018-04-10 PROCEDURE — 97140 MANUAL THERAPY 1/> REGIONS: CPT | Performed by: PHYSICAL THERAPIST

## 2018-04-10 PROCEDURE — 97112 NEUROMUSCULAR REEDUCATION: CPT | Performed by: PHYSICAL THERAPIST

## 2018-04-10 NOTE — PROGRESS NOTES
Daily Note     Today's date: 4/10/2018  Patient name: Wilver Boyer  : 1985  MRN: [id number]  Referring provider: Magdi Rendon DO  Dx:   Encounter Diagnosis     ICD-10-CM    1  Pelvic pain in female R10 2    2  Pelvic floor dysfunction M62 89                   Subjective: The patient states that she did not have much soreness after her previous session  She and her  did not have time to attempt intercourse since her last visit  Objective: See treatment diary below      Assessment: Tolerated treatment fair  Patient continues to demonstrate improvements     Performed new stretching/relaxation/pelvic floor downtraining exercises today as the patient reports that she has been doing these at home  She demonstrates burning around introitus still but tolerates pelvic floor muscle releases today in second and third layers of pelvic floor muscles  She demonstrates pelvic floor muscle weakness on both sides (R > L) and increased tension in the right pelvic floor muscles  She had some difficulty isolating her pelvic floor muscles and required cueing for form  Plan: Continue per plan of care  Re-evaluate next visit (3/24)      Precautions: multiple sclerosis    Daily Treatment Diary     Manual  2/22 3/6 3/12 3/19 3/26 4/3 4/10      Densensitization  techniques NP 15 min 15 min 10 min         Pelvic floor muscle releases and perineal stretches NP   10 min - left 20 min 20 min 20 min                                                 Exercise Diary  2/22 3/6 3/12 3/19 3/26 4/3 4/10      Diaphragmatic breathing NP HEP 2 min 3 min NP NP NP      DKC stretch with diaphragmatic breathing NP NP 3 breaths x 5 3 breaths x 5 stretches 5 breaths x 5 5 breaths x 5 5 breaths x5      PFMC: slow holds 5"W/10"R NP 10x 10x 10x NP 10x 10x      Prone stretch with PFM downtraining     3 breaths x 5 NP       Pelvic Floor "drops" with pball     2 breaths x 10 5 breaths x 5 times       pball pelvic circles       20x cw/ccw      Deep squat versus wall for pelvic floor downtraining/pelvic opening       3 breaths x5                                                                                                                                                                                   Modalities  2/22

## 2018-04-16 ENCOUNTER — APPOINTMENT (OUTPATIENT)
Dept: PHYSICAL THERAPY | Facility: REHABILITATION | Age: 33
End: 2018-04-16
Payer: COMMERCIAL

## 2018-04-17 ENCOUNTER — APPOINTMENT (OUTPATIENT)
Dept: PHYSICAL THERAPY | Facility: REHABILITATION | Age: 33
End: 2018-04-17
Payer: COMMERCIAL

## 2018-04-23 ENCOUNTER — OFFICE VISIT (OUTPATIENT)
Dept: PHYSICAL THERAPY | Facility: REHABILITATION | Age: 33
End: 2018-04-23
Payer: COMMERCIAL

## 2018-04-23 DIAGNOSIS — R10.2 PELVIC PAIN IN FEMALE: Primary | ICD-10-CM

## 2018-04-23 DIAGNOSIS — M62.89 PELVIC FLOOR DYSFUNCTION: ICD-10-CM

## 2018-04-23 PROCEDURE — 97112 NEUROMUSCULAR REEDUCATION: CPT | Performed by: PHYSICAL THERAPIST

## 2018-04-23 PROCEDURE — 97140 MANUAL THERAPY 1/> REGIONS: CPT | Performed by: PHYSICAL THERAPIST

## 2018-04-23 NOTE — PROGRESS NOTES
Assessment  Impairments: abnormal muscle tone, impaired physical strength, lacks appropriate home exercise program and pain with function    Assessment details: The patient has been treated for a total of 8 visits including today and her IE on   She has demonstrated improvements in tolerance to pelvic penetration at this time with treatment techniques  She continues to report pain, specifically burning, around the introitus at this time  She has more pain in the right perineum than the left  She did not tolerate a pelvic floor muscle examination at her initial evaluation and PT is now able to reach the second layer of pelvic floor muscles to perform gentle muscle releases  She continues to demonstrate global muscle hypertonicity and guarding as well as pelvic floor muscle weakness  She would benefit from continuing pelvic floor therapy to help reduce her pain, address her impairments, and improve her quality of life so she may be able to have intercourse with her  and tolerate a Gynecological examination  She reports partial compliance with HEP but she does have exercises to perform at home as well  She may be a candidate for self-stretching with use of vaginal dilators if her pain levels improve  Understanding of Dx/Px/POC: good   Prognosis: good    Goals  ST  The patient will reduce pain with penetration by 25% in 8 weeks  - partial met  2  The patient will be independent with self-stretching with vaginal dilators in 8 weeks  - not met  3  The patient will improve pelvic floor muscle strength by 1 MMT grade in 8 weeks  4  The patient will reduce pelvic floor muscle tone by 25 to 50% in 8 weeks  LT  The patient will tolerate a GYN exam with a speculum upon discharge  2  The patient will be able to have complete intercourse upon discharge       Plan  Patient would benefit from: skilled PT  Planned modality interventions: biofeedback  Planned therapy interventions: home exercise program, stretching, strengthening, therapeutic activities, therapeutic exercise, manual therapy, patient education and neuromuscular re-education (desensitization)  Frequency: 1x week  Duration in visits: 8  Duration in weeks: 10  Treatment plan discussed with: patient        Subjective Evaluation    History of Present Illness  Date of onset: 2015  Mechanism of injury: GYN History  -painful intercourse  -pain with gynecological exam  -no history  -regular menstrual cycle    The patient states that she has seen a visible difference with her vaginal opening, she feels that it appears to be "more open"  She states that she continues to experience pain during intercourse  She feels that her  is now able to penetrate 50% of the depth at this time  She notes that she is unable to tolerate rhythmic penetration  She describes the pain as burning  Pain  Current pain ratin  At best pain ratin (during intercourse)  At worst pain ratin (during GYN exam)  Quality: tight and burning  Exacerbated by: intercourse  Progression: improved    Social Support  Lives in: apartment  Lives with: spouse      Diagnostic Tests  No diagnostic tests performed  Treatments  No previous or current treatments  Patient Goals  Patient goal: To eliminate pain        PT Women's Health Subjective:   History of Present Illness:     Prior Pregnancy: No    Bladder Function:     Painful urination: No    Bowel Function:     Voiding DIfficulties: constipation      Bowel Function comments:  Every 1-2 days; sometimes strains for bowel movement; occasional stomach aches  Pain:     Current pain ratin    At best pain ratin (during intercourse)    At worst pain ratin (during GYN exam)    Quality:  Tight and burning    Exacerbated by: intercourse      Duration of symptoms:  Less than 1 hour and 1 to 2 hours    Progression:  Improved  Social Support:     Lives in:  [address] [place] [address] with:  Spouse  Diagnostic Tests:     None Treatments:     None    Patient Goals: Other patient goals:  ST  The patient will reduce pain with penetration by 25% in 8 weeks  - partial met  2  The patient will be independent with self-stretching with vaginal dilators in 8 weeks  - not met  3  The patient will improve pelvic floor muscle strength by 1 MMT grade in 8 weeks  4  The patient will reduce pelvic floor muscle tone by 25 to 50% in 8 weeks  LT  The patient will tolerate a GYN exam with a speculum upon discharge  2  The patient will be able to have complete intercourse upon discharge  Objective   Pelvic Floor Exam     External Exam   Position: supine exam    General Perineum Exam: perineum intact  Skin inspection:   no scars present  Visualization of PFM Contraction: bearing down or bulging with attempt and unable to isolate    Diastatis   External Palpation comments: Tenderness near perineum and levator ani muscles; tension/tightness in trigger points  Internal palpation: global hypertonicity; burning pain around introitus; able to reach second layer of pelvic floor muscles during assessment    Pelvic Floor Muscle Control   Pelvic floor muscle relaxation is incomplete     Accessory muscle activity: abdominals and adductors    PERF   Power right: 0/5  Power left:       Precautions: multiple sclerosis    Daily Treatment Diary     Manual  2/22 3/6 3/12 3/19 3/26 4/3 4/10 4/23     Densensitization  techniques NP 15 min 15 min 10 min         Pelvic floor muscle releases and perineal stretches NP   10 min - left 20 min 20 min 20 min 20 min     Re-evaluation        10 min                                   Exercise Diary  2/22 3/6 3/12 3/19 3/26 4/3 4/10 4/23     Diaphragmatic breathing NP HEP 2 min 3 min NP NP NP      DKC stretch with diaphragmatic breathing NP NP 3 breaths x 5 3 breaths x 5 stretches 5 breaths x 5 5 breaths x 5 5 breaths x5 5 breaths x 5     PFMC: slow holds 5"W/10"R NP 10x 10x 10x NP 10x 10x NP Prone stretch with PFM downtraining     3 breaths x 5 NP  NP     Pelvic Floor "drops" with pball     2 breaths x 10 5 breaths x 5 times  5 breaths x10     pball pelvic circles       20x cw/ccw NP     Deep squat versus wall for pelvic floor downtraining/pelvic opening       3 breaths x5 5 breaths x 3                                                                                                                                                                                   Modalities  2/22                                                       Access Code: [email or web address]   URL: [email or web address]  [email or web address]

## 2018-04-24 ENCOUNTER — APPOINTMENT (OUTPATIENT)
Dept: PHYSICAL THERAPY | Facility: REHABILITATION | Age: 33
End: 2018-04-24
Payer: COMMERCIAL

## 2018-05-01 ENCOUNTER — OFFICE VISIT (OUTPATIENT)
Dept: PHYSICAL THERAPY | Facility: REHABILITATION | Age: 33
End: 2018-05-01
Payer: COMMERCIAL

## 2018-05-07 ENCOUNTER — OFFICE VISIT (OUTPATIENT)
Dept: PHYSICAL THERAPY | Facility: REHABILITATION | Age: 33
End: 2018-05-07
Payer: COMMERCIAL

## 2018-05-07 DIAGNOSIS — R10.2 PELVIC PAIN IN FEMALE: Primary | ICD-10-CM

## 2018-05-07 DIAGNOSIS — M62.89 PELVIC FLOOR DYSFUNCTION: ICD-10-CM

## 2018-05-07 PROCEDURE — 97140 MANUAL THERAPY 1/> REGIONS: CPT | Performed by: PHYSICAL THERAPIST

## 2018-05-07 PROCEDURE — 97112 NEUROMUSCULAR REEDUCATION: CPT | Performed by: PHYSICAL THERAPIST

## 2018-05-07 NOTE — PROGRESS NOTES
Daily Note     Today's date: 2018  Patient name: Mario Erazo  : 1985  MRN: [id number]  Referring provider: Andrez Mancuso DO  Dx:   Encounter Diagnosis     ICD-10-CM    1  Pelvic pain in female R10 2    2  Pelvic floor dysfunction M62 89                   Subjective: The patient states that she attempted intercourse with her  yesterday and it was less painful than previously  She notes that the last few weeks she did have more pain  Objective: See treatment diary below      Assessment: Tolerated treatment fair  Patient continues to experience most pain and discomfort around introitus with initial insertion  She reports pinching on the right and burning on the left  She tolerated gentle muscle releases bilaterally  She has been treated for a total of 9 visits including today and her IE on 18  She has demonstrated improvements since beginning pelvic floor therapy - has an overall improved tolerance but continues to experience pain which limits her ability to have intercourse with her   Plan: Patient may be discharged at this time due to getting a new job and she is unable to keep her current schedule  She will also be moving to [place] near her new job at the end of the month  PT offered to place patient on cancellation list for a late appointment  PT also helped patient to find a new pelvic floor PT in [place] close to where she will be living and working       Precautions: multiple sclerosis    Daily Treatment Diary     Manual  2/22 3/6 3/12 3/19 3/26 4/3 4/10 4/23 5/7    Densensitization  techniques NP 15 min 15 min 10 min         Pelvic floor muscle releases and perineal stretches NP   10 min - left 20 min 20 min 20 min 20 min 20 min    Re-evaluation        10 min                                   Exercise Diary  2/22 3/6 3/12 3/19 3/26 43 4/10 4/23 57    Diaphragmatic breathing NP HEP 2 min 3 min NP NP NP NP NP    DKC stretch with diaphragmatic breathing NP NP 3 breaths x 5 3 breaths x 5 stretches 5 breaths x 5 5 breaths x 5 5 breaths x5 5 breaths x 5 5 breaths x 5    PFMC: slow holds 5"W/10"R NP 10x 10x 10x NP 10x 10x NP NP    Prone stretch with PFM downtraining     3 breaths x 5 NP  NP NP    Pelvic Floor "drops" with pball     2 breaths x 10 5 breaths x 5 times  5 breaths x10 3 breaths x10    pball pelvic circles       20x cw/ccw NP 20xcw/ccw    Deep squat versus wall for pelvic floor downtraining/pelvic opening       3 breaths x5 5 breaths x 3  3 breaths x 5                                                                                                                                                                                  Modalities  2/22                                                     Addendum: 7/2/18: The patient was treated for a total of 9 visits including today and her IE on 2/22/18  She recently started a new job and she and her  were planning on moving as she was going to commuting to her position in [place]  PT helped that patient to locate a pelvic floor therapist near her new job   Left a message for the patient to see if she was able to successfully get an appointment with another pelvic floor therapist

## 2018-05-14 ENCOUNTER — APPOINTMENT (OUTPATIENT)
Dept: PHYSICAL THERAPY | Facility: REHABILITATION | Age: 33
End: 2018-05-14
Payer: COMMERCIAL

## 2018-05-22 ENCOUNTER — APPOINTMENT (OUTPATIENT)
Dept: PHYSICAL THERAPY | Facility: REHABILITATION | Age: 33
End: 2018-05-22
Payer: COMMERCIAL

## 2018-05-29 ENCOUNTER — APPOINTMENT (OUTPATIENT)
Dept: PHYSICAL THERAPY | Facility: REHABILITATION | Age: 33
End: 2018-05-29
Payer: COMMERCIAL

## 2018-12-10 ENCOUNTER — TELEPHONE (OUTPATIENT)
Dept: OBGYN CLINIC | Facility: CLINIC | Age: 33
End: 2018-12-10

## 2018-12-10 NOTE — TELEPHONE ENCOUNTER
Patient states she would like try to conceive and she is currently taking Tecfidera 240 mg BID for her MS  Would like to know if she has to stop medication prior to trying to conceive  Advise she should start PNV and folic acid  Patent was last seen 2/2018 but is not planning on staying with practice due to location  She has moved about 1 1/2 away  Routing to provider to see how long patient has to be off medication before she starts trying to conceive

## 2018-12-11 NOTE — TELEPHONE ENCOUNTER
If possible, it is recommended to stop one month prior to trying to conceive  There is not a lot of great data available  If symptoms worsen during pregnancy, there are options in pregnancy that could be used and should be discussed with her neurologist and OB

## 2018-12-11 NOTE — TELEPHONE ENCOUNTER
Spoke with patient it is recommend to stop one month prior to trying to conceive  There is not a lot of great data available  If symptoms worsen during pregnancy, there are options in pregnancy that could be use and she be discussed with neurologist and OB  Verbalized understanding

## 2022-07-21 NOTE — RESULT NOTES
Verified Results  (LC) HCV Antibody reflex to MIRANDA [id number] 12:35PM Katie Young     Test Name Result Flag Reference   HCV Ab Hepatitis C virus Ab Signal/Cutoff <0 1 s/co ratio  0 0-0 9   Interpretation: Comment     Negative  Not infected with HCV, unless recent infection is suspected or other  evidence exists to indicate HCV infection  Hydroxyzine Pregnancy And Lactation Text: This medication is not safe during pregnancy and should not be taken. It is also excreted in breast milk and breast feeding isn't recommended.